# Patient Record
Sex: MALE | Race: BLACK OR AFRICAN AMERICAN | NOT HISPANIC OR LATINO | Employment: OTHER | URBAN - METROPOLITAN AREA
[De-identification: names, ages, dates, MRNs, and addresses within clinical notes are randomized per-mention and may not be internally consistent; named-entity substitution may affect disease eponyms.]

---

## 2023-03-08 DIAGNOSIS — N13.30 HYDRONEPHROSIS, UNSPECIFIED HYDRONEPHROSIS TYPE: ICD-10-CM

## 2023-03-08 DIAGNOSIS — K76.9 LIVER LESION: Primary | ICD-10-CM

## 2023-03-08 NOTE — PROGRESS NOTES
I tried to call patient myself, left voicemail.  We will continue to have medical assistant tried to reach out.    Liver lesion seen on MRI, highly suspicious for malignancy.  Referral to oncology and hepatology placed.    Also seen, hydronephrosis, possible bladder lesion.  Urology referral placed.    Christopher D'Amico, DO

## 2023-03-13 ENCOUNTER — TELEMEDICINE (OUTPATIENT)
Dept: PHARMACY | Facility: HOSPITAL | Age: 68
End: 2023-03-13
Payer: MEDICARE

## 2023-03-13 ENCOUNTER — TELEPHONE (OUTPATIENT)
Dept: PRIMARY CARE | Facility: CLINIC | Age: 68
End: 2023-03-13
Payer: MEDICARE

## 2023-03-13 DIAGNOSIS — E11.9 TYPE 2 DIABETES MELLITUS WITHOUT COMPLICATION, WITHOUT LONG-TERM CURRENT USE OF INSULIN (MULTI): Primary | ICD-10-CM

## 2023-03-13 RX ORDER — ASPIRIN 81 MG/1
1 TABLET ORAL DAILY
COMMUNITY
Start: 2023-02-17

## 2023-03-13 RX ORDER — METFORMIN HYDROCHLORIDE 1000 MG/1
1000 TABLET ORAL
Qty: 180 TABLET | Refills: 1 | Status: SHIPPED | OUTPATIENT
Start: 2023-03-13 | End: 2023-07-14 | Stop reason: SINTOL

## 2023-03-13 RX ORDER — LOSARTAN POTASSIUM 50 MG/1
1 TABLET ORAL DAILY
COMMUNITY
Start: 2023-02-13

## 2023-03-13 RX ORDER — SIMVASTATIN 40 MG/1
40 TABLET, FILM COATED ORAL NIGHTLY
COMMUNITY
Start: 2023-02-13

## 2023-03-13 RX ORDER — CALCIUM CITRATE/VITAMIN D3 200MG-6.25
TABLET ORAL
COMMUNITY
Start: 2023-01-30

## 2023-03-13 RX ORDER — METFORMIN HYDROCHLORIDE 1000 MG/1
1000 TABLET ORAL
COMMUNITY
End: 2023-03-13 | Stop reason: SDUPTHER

## 2023-03-13 RX ORDER — EMPAGLIFLOZIN 25 MG/1
1 TABLET, FILM COATED ORAL DAILY
COMMUNITY
Start: 2023-02-20 | End: 2023-05-10 | Stop reason: ALTCHOICE

## 2023-03-13 RX ORDER — OMEPRAZOLE 20 MG/1
1 CAPSULE, DELAYED RELEASE ORAL DAILY
COMMUNITY
Start: 2023-02-17

## 2023-03-13 NOTE — TELEPHONE ENCOUNTER
SPOKE WITH PATIENT AND APT. S HAVE BEEN MADE----- Message from Christopher D'Amico, DO sent at 3/10/2023  8:39 AM EST -----  This is the old ultrasound that we have already seen, that prompted us to order MRI.  Please make sure that we have helped him get scheduled for all the referrals I placed including oncology, hepatology, urology.

## 2023-03-13 NOTE — PROGRESS NOTES
[facility]: Diabetes Management  Timbo Cohen was referred to Clinical Pharmacy Team for diabetes management.    Referring Provider: Christopher D'Amico, DO Subjective   Patient ID: Timbo Cohen is a 67 y.o. male who presents for Diabetes management.    DIET  - Breakfast: [place]  - Lunch: tuna sandwich, chips, soda  - Dinner: varies from day to day but makes sure to veggies, potato)  - Snacks: chips, fruits    SOCIAL HX  Alcohol: occasionally (does not drink daily)  Caffeine: used to drink 2 cups of coffee daily -> now none   Smoking: None    EXERCISE  - [employer] - includes [employer] activities   - Goes to gym every other day with wife exercising ~45 mins for 4 days/weeks    No Known Allergies     Current Outpatient Medications on File Prior to Visit   Medication Sig Dispense Refill    aspirin 81 mg EC tablet Take 1 tablet (81 mg) by mouth once daily.      Jardiance 25 mg Take 1 tablet (25 mg) by mouth once daily.      losartan (Cozaar) 50 mg tablet Take 1 tablet (50 mg) by mouth once daily.      omeprazole (PriLOSEC) 20 mg DR capsule Take 1 capsule (20 mg) by mouth once daily.      simvastatin (Zocor) 40 mg tablet Take 1 tablet (40 mg) by mouth once daily at bedtime.      True Metrix Glucose Test Strip strip TEST THREE TIMES A DAY      metFORMIN (Glucophage) 1,000 mg tablet Take 1 tablet (1,000 mg) by mouth in the morning and 1 tablet (1,000 mg) in the evening. Take with meals.       No current facility-administered medications on file prior to visit.      Issues reported in regards to accessibility, affordability, adherence, adverse effects: None    Objective     LAB RESULTS:  Lab Results   Component Value Date    HGBA1C 11.9 (A) 01/30/2023     Lab Results   Component Value Date    CREATININE 1.30 01/30/2023    GFRMALE 60 01/30/2023     (H) 01/30/2023     (H) 01/30/2023      Lab Results   Component Value Date    TRIG 135 01/30/2023    CHOL 168 01/30/2023    HDL 35.0 (A)  2023       SMBG MEASUREMENTS  - FB-136 mg/dL (pt states 136 was highest BG value noticed recently, but states this was because he had some chocolate the night before)   - PPG: hasn't checked     Assessment/Plan   DIABETES ASSESSMENT    CURRENT PHARMACOTHERAPY  - Metformin 1000 mg BID   - Jardiance 25 mg once daily    SECONDARY PREVENTION  - Statin? Yes  - ACE-I/ARB? Yes  - Aspirin? Yes    Has the patient experienced any low sugars since last contact? No  - denies symptoms of low blood glucose: sweaty, shaky, anxious, excessive hunger, confusion, lightheaded, nauseous, blurred vision  Has the patient experienced any high sugars since last contact? No  - denies symptoms of high blood glucose: excessive thirst, frequent urination, fatigue, nausea, shortness of breath, trouble concentrating  _______________________________________________________________________    PATIENT EDUCATION/GOALS  - Fasting B - 130 mg/dL  - Postprandial BG: less than 180 mg/dL  - A1c: less than 7%   - Discussed diabetes plate method dietary plan - increasing greens/nonstarchy veggies and protein while decreasing starchy foods/carbs (potatoes, bread, pasta, etc)     RECOMMENDATIONS/PLAN  1. CONTINUE taking metformin and Jardiance as directed   2. Refills sent for metformin to patient's preferred pharmacy ([facility])   3. Log SMBG (once in AM before breakfast, may also take once in PM 2 hrs after meal) to review during next follow up    Clinical Pharmacist follow-up: 4-6 weeks (f/u with labs)    Briana Bourne, PharmD  PGY1 Pharmacy Resident     Verbal consent to manage patient's drug therapy was obtained from the patient. They were informed they may decline to participate or withdraw from participation in pharmacy services at any time.

## 2023-03-14 ENCOUNTER — TELEPHONE (OUTPATIENT)
Dept: PRIMARY CARE | Facility: CLINIC | Age: 68
End: 2023-03-14
Payer: MEDICARE

## 2023-03-20 ENCOUNTER — TELEPHONE (OUTPATIENT)
Dept: PRIMARY CARE | Facility: CLINIC | Age: 68
End: 2023-03-20
Payer: MEDICARE

## 2023-03-20 NOTE — TELEPHONE ENCOUNTER
Pt called complaining of digestive problems and rectal bleeding x 3 days.  Advise covering provider of patient complaints. Per covering provider: next plan of care will be to go to the ER for eval. Pt expressed understanding.

## 2023-03-31 ENCOUNTER — TELEPHONE (OUTPATIENT)
Dept: PRIMARY CARE | Facility: CLINIC | Age: 68
End: 2023-03-31

## 2023-04-05 NOTE — TELEPHONE ENCOUNTER
Patient called to let you know the metformin & jardiance caused severe constipation and he ended up impacted and went to the ER last week; he did say that he has talked to Natalie and she put him on Farxiga; but I think he still wants to talk to you later

## 2023-04-12 ENCOUNTER — HOSPITAL ENCOUNTER (OUTPATIENT)
Dept: DATA CONVERSION | Facility: HOSPITAL | Age: 68
End: 2023-04-12
Attending: INTERNAL MEDICINE | Admitting: INTERNAL MEDICINE
Payer: MEDICARE

## 2023-04-12 DIAGNOSIS — K76.89 OTHER SPECIFIED DISEASES OF LIVER: ICD-10-CM

## 2023-04-12 DIAGNOSIS — C78.7 SECONDARY MALIGNANT NEOPLASM OF LIVER AND INTRAHEPATIC BILE DUCT (MULTI): ICD-10-CM

## 2023-04-12 DIAGNOSIS — C18.9 MALIGNANT NEOPLASM OF COLON, UNSPECIFIED (MULTI): ICD-10-CM

## 2023-04-15 LAB
COMPLETE PATHOLOGY REPORT: NORMAL
CONVERTED ADDENDUM DIAGNOSIS 2: NORMAL
CONVERTED ADDENDUM DIAGNOSIS 3: NORMAL
CONVERTED ADDENDUM DIAGNOSIS 4: NORMAL
CONVERTED ADDENDUM DIAGNOSIS 5: NORMAL
CONVERTED CLINICAL DIAGNOSIS-HISTORY: NORMAL
CONVERTED FINAL DIAGNOSIS: NORMAL
CONVERTED FINAL REPORT PDF LINK TO COPY AND PASTE: NORMAL
CONVERTED GROSS DESCRIPTION: NORMAL

## 2023-04-25 ENCOUNTER — HOSPITAL ENCOUNTER (OUTPATIENT)
Dept: DATA CONVERSION | Facility: HOSPITAL | Age: 68
End: 2023-04-25
Attending: INTERNAL MEDICINE | Admitting: INTERNAL MEDICINE
Payer: MEDICARE

## 2023-04-25 DIAGNOSIS — C18.9 MALIGNANT NEOPLASM OF COLON, UNSPECIFIED (MULTI): ICD-10-CM

## 2023-04-25 DIAGNOSIS — K21.9 GASTRO-ESOPHAGEAL REFLUX DISEASE WITHOUT ESOPHAGITIS: ICD-10-CM

## 2023-04-25 DIAGNOSIS — Z45.2 ENCOUNTER FOR ADJUSTMENT AND MANAGEMENT OF VASCULAR ACCESS DEVICE: ICD-10-CM

## 2023-04-25 DIAGNOSIS — E78.5 HYPERLIPIDEMIA, UNSPECIFIED: ICD-10-CM

## 2023-04-25 DIAGNOSIS — E11.9 TYPE 2 DIABETES MELLITUS WITHOUT COMPLICATIONS (MULTI): ICD-10-CM

## 2023-04-25 DIAGNOSIS — I10 ESSENTIAL (PRIMARY) HYPERTENSION: ICD-10-CM

## 2023-05-09 ENCOUNTER — TELEPHONE (OUTPATIENT)
Dept: PHARMACY | Facility: HOSPITAL | Age: 68
End: 2023-05-09
Payer: MEDICARE

## 2023-05-09 NOTE — TELEPHONE ENCOUNTER
Payor:  Kait  PCP: Christopher D'Amico, DO  Adherence  Dx: DM    Lab Results   Component Value Date    HGBA1C 11.9 (A) 01/30/2023     Last LDL: 106mg/dL (1/30/23)    Adherence  Med: Simvastatin 40mg  Last filled: 2/13/23  Next fill due: 3/14/23  Notes: Patient states [facility] does not have refills left. Appears last time this was renewed, it was sent to [facility]  Action: Call [facility] to request prescription transfer     While on phone, patient also requests new prescription for Farxiga. Per dispense report, last prescribed by MARY LOU Alfaro who appears to be a [facility] provider and patient does not recognize who this is. Will route to PCP to request refills be sent [facility].    Lluvia Isaac, PharmD

## 2023-05-10 DIAGNOSIS — E11.9 TYPE 2 DIABETES MELLITUS WITHOUT COMPLICATION, WITHOUT LONG-TERM CURRENT USE OF INSULIN (MULTI): Primary | ICD-10-CM

## 2023-05-10 RX ORDER — DAPAGLIFLOZIN 10 MG/1
10 TABLET, FILM COATED ORAL DAILY
Qty: 90 TABLET | Refills: 1 | Status: SHIPPED | OUTPATIENT
Start: 2023-05-10 | End: 2023-11-06

## 2023-06-26 DIAGNOSIS — E11.9 TYPE 2 DIABETES MELLITUS WITHOUT COMPLICATION, WITHOUT LONG-TERM CURRENT USE OF INSULIN (MULTI): Primary | ICD-10-CM

## 2023-07-03 ENCOUNTER — TELEMEDICINE (OUTPATIENT)
Dept: PHARMACY | Facility: HOSPITAL | Age: 68
End: 2023-07-03
Payer: MEDICARE

## 2023-07-03 DIAGNOSIS — E11.9 TYPE 2 DIABETES MELLITUS WITHOUT COMPLICATION, WITHOUT LONG-TERM CURRENT USE OF INSULIN (MULTI): Primary | ICD-10-CM

## 2023-07-03 NOTE — PROGRESS NOTES
"Pharmacist Clinic: Diabetes Management  Timbo Cohen is a 68 y.o. male who presents for a follow-up evaluation of his Type 2 diabetes mellitus. At last visit, I asked the patient to check his blood glucose without the MORGAN (glyburide) so we can determine if he needs to continue on the medication.     Referring Provider: Christopher D'Amico, DO     LAB REVIEW   Glucose   Date Value   06/29/2023 118 mg/dL (H)   06/15/2023 102 mg/dL (H)   06/01/2023 CANCELED     Hemoglobin A1C (%)   Date Value   01/30/2023 11.9 (A)     Bicarbonate   Date Value   06/29/2023 27 mmol/L   06/15/2023 24 mmol/L   06/01/2023 CANCELED     Urea Nitrogen   Date Value   06/29/2023 8 mg/dL   06/15/2023 9 mg/dL   06/01/2023 CANCELED     Creatinine   Date Value   06/29/2023 0.59 mg/dL   06/15/2023 0.74 mg/dL   06/01/2023 CANCELED     Lab Results   Component Value Date    CHOL 168 01/30/2023     Lab Results   Component Value Date    HDL 35.0 (A) 01/30/2023     No results found for: \"LDLCALC\"  Lab Results   Component Value Date    TRIG 135 01/30/2023       DIABETES ASSESSMENT    CURRENT PHARMACOTHERAPY  - farxiga 10 mg once daily    SECONDARY PREVENTION  - Statin? Yes  - ACE-I/ARB? Yes    HISTORICAL PHARMACOTHERAPY  - metformin   - jardiance     SMBG MEASUREMENTS (fasting):  between 103-117     DISCUSSION:   - Patient is finishing chemo, had a scan today for results on Thursday   - Patients blood sugars look good on just farxiga, plan to continue on with that for now  - Will follow up in one month for fasting blood glucose readings    RECOMMENDATIONS/PLAN  1. Patients diabetes is improving  based on SMBG readings with most recent A1c of 11.9 % (goal < 7 %).   - Continue all meds under the continuation of care with the referring provider and clinical pharmacy team.    Clinical Pharmacist follow up: one month     Thank you,  Natalie Baker, PharmD    Verbal consent to manage patient's drug therapy was obtained from the patient. They were informed they " may decline to participate or withdraw from participation in pharmacy services at any time.

## 2023-07-14 PROBLEM — I10 PRIMARY HYPERTENSION: Status: ACTIVE | Noted: 2019-12-17

## 2023-07-14 PROBLEM — E55.9 VITAMIN D DEFICIENCY: Status: ACTIVE | Noted: 2023-07-14

## 2023-07-14 PROBLEM — I10 HTN (HYPERTENSION), BENIGN: Status: ACTIVE | Noted: 2023-07-14

## 2023-07-14 PROBLEM — R63.4 WEIGHT LOSS, UNINTENTIONAL: Status: ACTIVE | Noted: 2022-03-28

## 2023-07-14 PROBLEM — K63.89 COLONIC MASS: Status: ACTIVE | Noted: 2022-03-28

## 2023-07-14 PROBLEM — Z85.46 H/O PROSTATE CANCER: Status: ACTIVE | Noted: 2023-07-14

## 2023-07-14 PROBLEM — E11.40 TYPE 2 DIABETES MELLITUS WITH DIABETIC NEUROPATHY, UNSPECIFIED (MULTI): Status: ACTIVE | Noted: 2019-12-17

## 2023-07-14 PROBLEM — E11.9 TYPE 2 DIABETES MELLITUS WITHOUT COMPLICATION, WITHOUT LONG-TERM CURRENT USE OF INSULIN (MULTI): Status: ACTIVE | Noted: 2023-07-14

## 2023-07-14 PROBLEM — K76.9 LIVER LESION: Status: ACTIVE | Noted: 2023-07-14

## 2023-07-14 PROBLEM — E44.1 MALNUTRITION OF MILD DEGREE (MULTI): Status: ACTIVE | Noted: 2022-03-25

## 2023-07-14 PROBLEM — R74.01 ELEVATED TRANSAMINASE LEVEL: Status: ACTIVE | Noted: 2023-07-14

## 2023-07-14 PROBLEM — R79.89 ELEVATED LFTS: Status: ACTIVE | Noted: 2023-07-14

## 2023-07-14 PROBLEM — C44.92 SCC (SQUAMOUS CELL CARCINOMA): Status: ACTIVE | Noted: 2023-07-14

## 2023-07-14 PROBLEM — N13.30 HYDRONEPHROSIS, LEFT: Status: ACTIVE | Noted: 2023-07-14

## 2023-07-14 PROBLEM — K21.9 CHRONIC GERD: Status: ACTIVE | Noted: 2023-07-14

## 2023-07-14 PROBLEM — C18.7 CANCER OF SIGMOID COLON (MULTI): Status: ACTIVE | Noted: 2022-04-08

## 2023-07-14 PROBLEM — D50.0 IRON DEFICIENCY ANEMIA DUE TO CHRONIC BLOOD LOSS: Status: ACTIVE | Noted: 2022-03-28

## 2023-07-14 PROBLEM — K64.8 INFLAMED INTERNAL HEMORRHOID: Status: ACTIVE | Noted: 2023-07-14

## 2023-07-14 PROBLEM — R53.83 FATIGUE: Status: ACTIVE | Noted: 2023-07-14

## 2023-07-14 PROBLEM — C18.9 MALIGNANT NEOPLASM OF COLON, UNSPECIFIED (MULTI): Status: ACTIVE | Noted: 2023-07-14

## 2023-07-14 PROBLEM — E78.5 HLD (HYPERLIPIDEMIA): Status: ACTIVE | Noted: 2023-07-14

## 2023-07-14 PROBLEM — S37.20XA BLADDER INJURY: Status: ACTIVE | Noted: 2022-04-08

## 2023-07-14 PROBLEM — C7A.8: Status: ACTIVE | Noted: 2022-04-08

## 2023-07-14 PROBLEM — E78.00 HYPERCHOLESTEROLEMIA: Status: ACTIVE | Noted: 2022-03-28

## 2023-07-18 ENCOUNTER — APPOINTMENT (OUTPATIENT)
Dept: PRIMARY CARE | Facility: CLINIC | Age: 68
End: 2023-07-18
Payer: MEDICARE

## 2023-08-18 ENCOUNTER — TELEMEDICINE (OUTPATIENT)
Dept: PHARMACY | Facility: HOSPITAL | Age: 68
End: 2023-08-18
Payer: MEDICARE

## 2023-08-18 DIAGNOSIS — E11.9 TYPE 2 DIABETES MELLITUS WITHOUT COMPLICATION, WITHOUT LONG-TERM CURRENT USE OF INSULIN (MULTI): Primary | ICD-10-CM

## 2023-08-18 NOTE — PROGRESS NOTES
"Pharmacist Clinic: Diabetes Management  Timbo Cohen is a 68 y.o. male who presents for a follow-up evaluation of his Type 2 diabetes mellitus. At last visit, no mediation changes were made.     Referring Provider: Christopher D'Amico,      LAB REVIEW   Glucose (mg/dL)   Date Value   08/10/2023 97   07/27/2023 123 (H)   07/12/2023 115 (H)     Hemoglobin A1C (%)   Date Value   01/30/2023 11.9 (A)   03/30/2022 6.9 (H)   03/19/2021 7.7 (H)     Bicarbonate (mmol/L)   Date Value   08/10/2023 27   07/27/2023 29   07/12/2023 25     Urea Nitrogen (mg/dL)   Date Value   08/10/2023 5 (L)   07/27/2023 9   07/12/2023 10     Creatinine (mg/dL)   Date Value   08/10/2023 0.43 (L)   07/27/2023 0.44 (L)   07/12/2023 0.64     Lab Results   Component Value Date    CHOL 168 01/30/2023     Lab Results   Component Value Date    HDL 35.0 (A) 01/30/2023     No results found for: \"LDLCALC\"  Lab Results   Component Value Date    TRIG 135 01/30/2023       DIABETES ASSESSMENT    CURRENT PHARMACOTHERAPY  - farxiga 10 mg once daily    SECONDARY PREVENTION  - Statin? Yes  - ACE-I/ARB? Yes    HISTORICAL PHARMACOTHERAPY  - metformin   - jardiance     SMBG MEASUREMENTS (fasting):  between 104-110    DISCUSSION:   - Patient is undergoing chemo, last round he had diarrhea  - Counseled to hold farxiga if he is experiencing N/V or diarrhea, this medicine requires you to be hydrated to work     RECOMMENDATIONS/PLAN  1. Patients diabetes is improving  based on SMBG readings with most recent A1c of 11.9 % (goal < 7 %).   - Continue all meds under the continuation of care with the referring provider and clinical pharmacy team.    Clinical Pharmacist follow up: as needed     Thank you,  Natalie Baker, PharmD    Verbal consent to manage patient's drug therapy was obtained from the patient. They were informed they may decline to participate or withdraw from participation in pharmacy services at any time.   "

## 2023-09-07 VITALS — BODY MASS INDEX: 25.23 KG/M2 | HEIGHT: 72 IN | WEIGHT: 186.29 LBS

## 2023-10-19 ENCOUNTER — TELEPHONE (OUTPATIENT)
Dept: PHARMACY | Facility: HOSPITAL | Age: 68
End: 2023-10-19
Payer: MEDICARE

## 2023-10-19 NOTE — TELEPHONE ENCOUNTER
Population Health: Outreach by Ambulatory Pharmacy Team    Payor: Kait MATA  Reason: Adherence  Medication: Farxiga 10 mg  Outcome: Left Voicemail    Sondra Sorenson. PGY-1 Resident

## 2023-10-20 NOTE — TELEPHONE ENCOUNTER
I reviewed the progress note and agree with the resident’s findings and plans as written. Case discussed with resident.    Chuckie Bowman, PharmD